# Patient Record
Sex: FEMALE | Race: WHITE | NOT HISPANIC OR LATINO | Employment: UNEMPLOYED | URBAN - METROPOLITAN AREA
[De-identification: names, ages, dates, MRNs, and addresses within clinical notes are randomized per-mention and may not be internally consistent; named-entity substitution may affect disease eponyms.]

---

## 2024-09-04 ENCOUNTER — APPOINTMENT (OUTPATIENT)
Dept: CT IMAGING | Facility: HOSPITAL | Age: 19
End: 2024-09-04

## 2024-09-04 ENCOUNTER — HOSPITAL ENCOUNTER (EMERGENCY)
Facility: HOSPITAL | Age: 19
Discharge: PSYCHIATRIC OR SUBSTANCE USE FACILITY | End: 2024-09-05
Attending: EMERGENCY MEDICINE

## 2024-09-04 DIAGNOSIS — F31.12 MODERATE BIPOLAR I DISORDER WITH MANIA AS CURRENT EPISODE: Primary | ICD-10-CM

## 2024-09-04 DIAGNOSIS — F12.90 MARIJUANA USE: ICD-10-CM

## 2024-09-04 DIAGNOSIS — G93.89 BRAIN MASS: ICD-10-CM

## 2024-09-04 LAB
AMPHET+METHAMPHET UR QL: NEGATIVE
AMPHETAMINES UR QL: NEGATIVE
ANION GAP SERPL CALCULATED.3IONS-SCNC: 6 MMOL/L (ref 5–15)
APAP SERPL-MCNC: <5 MCG/ML (ref 0–30)
B-HCG UR QL: NEGATIVE
BARBITURATES UR QL SCN: NEGATIVE
BASOPHILS # BLD AUTO: 0.03 10*3/MM3 (ref 0–0.2)
BASOPHILS NFR BLD AUTO: 0.4 % (ref 0–1.5)
BENZODIAZ UR QL SCN: NEGATIVE
BILIRUB UR QL STRIP: NEGATIVE
BUN SERPL-MCNC: 12 MG/DL (ref 6–20)
BUN/CREAT SERPL: 25.5 (ref 7–25)
BUPRENORPHINE SERPL-MCNC: NEGATIVE NG/ML
CALCIUM SPEC-SCNC: 9.2 MG/DL (ref 8.6–10.5)
CANNABINOIDS SERPL QL: POSITIVE
CHLORIDE SERPL-SCNC: 107 MMOL/L (ref 98–107)
CLARITY UR: ABNORMAL
CO2 SERPL-SCNC: 26 MMOL/L (ref 22–29)
COCAINE UR QL: NEGATIVE
COLOR UR: YELLOW
CREAT SERPL-MCNC: 0.47 MG/DL (ref 0.57–1)
DEPRECATED RDW RBC AUTO: 47.1 FL (ref 37–54)
EGFRCR SERPLBLD CKD-EPI 2021: 139.1 ML/MIN/1.73
EOSINOPHIL # BLD AUTO: 0.05 10*3/MM3 (ref 0–0.4)
EOSINOPHIL NFR BLD AUTO: 0.7 % (ref 0.3–6.2)
ERYTHROCYTE [DISTWIDTH] IN BLOOD BY AUTOMATED COUNT: 15 % (ref 12.3–15.4)
ETHANOL BLD-MCNC: <10 MG/DL (ref 0–10)
ETHANOL UR QL: <0.01 %
GLUCOSE SERPL-MCNC: 104 MG/DL (ref 65–99)
GLUCOSE UR STRIP-MCNC: NEGATIVE MG/DL
HCT VFR BLD AUTO: 36.1 % (ref 34–46.6)
HGB BLD-MCNC: 11.2 G/DL (ref 12–15.9)
HGB UR QL STRIP.AUTO: NEGATIVE
IMM GRANULOCYTES # BLD AUTO: 0.01 10*3/MM3 (ref 0–0.05)
IMM GRANULOCYTES NFR BLD AUTO: 0.1 % (ref 0–0.5)
KETONES UR QL STRIP: NEGATIVE
LEUKOCYTE ESTERASE UR QL STRIP.AUTO: NEGATIVE
LYMPHOCYTES # BLD AUTO: 2.1 10*3/MM3 (ref 0.7–3.1)
LYMPHOCYTES NFR BLD AUTO: 29.8 % (ref 19.6–45.3)
MAGNESIUM SERPL-MCNC: 2.3 MG/DL (ref 1.6–2.6)
MCH RBC QN AUTO: 26.4 PG (ref 26.6–33)
MCHC RBC AUTO-ENTMCNC: 31 G/DL (ref 31.5–35.7)
MCV RBC AUTO: 84.9 FL (ref 79–97)
METHADONE UR QL SCN: NEGATIVE
MONOCYTES # BLD AUTO: 0.57 10*3/MM3 (ref 0.1–0.9)
MONOCYTES NFR BLD AUTO: 8.1 % (ref 5–12)
NEUTROPHILS NFR BLD AUTO: 4.29 10*3/MM3 (ref 1.7–7)
NEUTROPHILS NFR BLD AUTO: 60.9 % (ref 42.7–76)
NITRITE UR QL STRIP: NEGATIVE
OPIATES UR QL: NEGATIVE
OXYCODONE UR QL SCN: NEGATIVE
PCP UR QL SCN: NEGATIVE
PH UR STRIP.AUTO: 6 [PH] (ref 5–8)
PLATELET # BLD AUTO: 324 10*3/MM3 (ref 140–450)
PMV BLD AUTO: 9.2 FL (ref 6–12)
POTASSIUM SERPL-SCNC: 3.8 MMOL/L (ref 3.5–5.2)
PROT UR QL STRIP: NEGATIVE
RBC # BLD AUTO: 4.25 10*6/MM3 (ref 3.77–5.28)
SALICYLATES SERPL-MCNC: <0.3 MG/DL
SODIUM SERPL-SCNC: 139 MMOL/L (ref 136–145)
SP GR UR STRIP: >=1.03 (ref 1–1.03)
TRICYCLICS UR QL SCN: NEGATIVE
UROBILINOGEN UR QL STRIP: ABNORMAL
WBC NRBC COR # BLD AUTO: 7.05 10*3/MM3 (ref 3.4–10.8)

## 2024-09-04 PROCEDURE — 81025 URINE PREGNANCY TEST: CPT | Performed by: EMERGENCY MEDICINE

## 2024-09-04 PROCEDURE — 81003 URINALYSIS AUTO W/O SCOPE: CPT | Performed by: EMERGENCY MEDICINE

## 2024-09-04 PROCEDURE — 99285 EMERGENCY DEPT VISIT HI MDM: CPT

## 2024-09-04 PROCEDURE — 70450 CT HEAD/BRAIN W/O DYE: CPT

## 2024-09-04 PROCEDURE — 85025 COMPLETE CBC W/AUTO DIFF WBC: CPT | Performed by: EMERGENCY MEDICINE

## 2024-09-04 PROCEDURE — 99285 EMERGENCY DEPT VISIT HI MDM: CPT | Performed by: EMERGENCY MEDICINE

## 2024-09-04 PROCEDURE — 83735 ASSAY OF MAGNESIUM: CPT | Performed by: EMERGENCY MEDICINE

## 2024-09-04 PROCEDURE — 80143 DRUG ASSAY ACETAMINOPHEN: CPT | Performed by: EMERGENCY MEDICINE

## 2024-09-04 PROCEDURE — 80306 DRUG TEST PRSMV INSTRMNT: CPT | Performed by: EMERGENCY MEDICINE

## 2024-09-04 PROCEDURE — 80048 BASIC METABOLIC PNL TOTAL CA: CPT | Performed by: EMERGENCY MEDICINE

## 2024-09-04 PROCEDURE — 36415 COLL VENOUS BLD VENIPUNCTURE: CPT

## 2024-09-04 PROCEDURE — 80179 DRUG ASSAY SALICYLATE: CPT | Performed by: EMERGENCY MEDICINE

## 2024-09-04 PROCEDURE — 82077 ASSAY SPEC XCP UR&BREATH IA: CPT | Performed by: EMERGENCY MEDICINE

## 2024-09-05 VITALS
WEIGHT: 200 LBS | SYSTOLIC BLOOD PRESSURE: 118 MMHG | OXYGEN SATURATION: 98 % | HEIGHT: 62 IN | HEART RATE: 77 BPM | BODY MASS INDEX: 36.8 KG/M2 | DIASTOLIC BLOOD PRESSURE: 63 MMHG | RESPIRATION RATE: 18 BRPM | TEMPERATURE: 97.9 F

## 2024-09-05 RX ORDER — ACETAMINOPHEN 500 MG
1000 TABLET ORAL ONCE
Status: COMPLETED | OUTPATIENT
Start: 2024-09-05 | End: 2024-09-05

## 2024-09-05 RX ADMIN — ACETAMINOPHEN 1000 MG: 500 TABLET ORAL at 17:40

## 2024-09-05 NOTE — ED NOTES
"Per [facility], \"report is to be called when patient is about to leave ED\" EMS ETA is 2200   "

## 2024-09-05 NOTE — ED NOTES
Referral form and COVID screening document faxed to [facility] for consult request per Dr. T [facility] order.

## 2024-09-05 NOTE — FSED PROVIDER NOTE
Subjective   History of Present Illness  19-year-old male with history of bipolar disease but unmedicated presents complaint of 3 months of headache body aches and unsteadiness.  Patient states no chest pain or shortness of breath no fever chills shakes no recent trauma.  Patient states she did strike her head into a fence in February and states this was not evaluated.  Patient states no new injury since then.  Of note during evaluation patient did exhibit flight of ideas and did state that she has been diagnosed with bipolar disease but has not been medicated and states several weeks ago she may have had some suicidal ideations but no homicidal ideations and is somewhat nebulous about whether she is currently having suicidal ideations.  Patient states she does hear voices at times but does not admit to any recently though tends to avoid answering this question directly.  Patient states no abdominal pain and that she has been eating well.      Review of Systems   Constitutional:  Positive for fatigue.   Neurological:  Positive for headaches.   Psychiatric/Behavioral:  Positive for behavioral problems, hallucinations and suicidal ideas. The patient is nervous/anxious.    All other systems reviewed and are negative.      Past Medical History:   Diagnosis Date    Bipolar 1 disorder        No Known Allergies    History reviewed. No pertinent surgical history.    History reviewed. No pertinent family history.    Social History     Socioeconomic History    Marital status: Single           Objective   Physical Exam  Vitals and nursing note reviewed.   Constitutional:       Appearance: Normal appearance.   HENT:      Head: Normocephalic and atraumatic.      Right Ear: External ear normal.      Left Ear: External ear normal.      Nose: Nose normal.      Mouth/Throat:      Mouth: Mucous membranes are moist.      Pharynx: Oropharynx is clear.   Eyes:      Extraocular Movements: Extraocular movements intact.      Pupils: Pupils  are equal, round, and reactive to light.   Cardiovascular:      Rate and Rhythm: Normal rate and regular rhythm.      Pulses: Normal pulses.      Heart sounds: Normal heart sounds.   Pulmonary:      Effort: Pulmonary effort is normal.      Breath sounds: Normal breath sounds.   Abdominal:      General: Abdomen is flat.      Palpations: Abdomen is soft.   Musculoskeletal:         General: Normal range of motion.      Cervical back: Normal range of motion and neck supple.   Skin:     General: Skin is warm.   Neurological:      General: No focal deficit present.      Mental Status: She is alert and oriented to person, place, and time. Mental status is at baseline.   Psychiatric:         Judgment: Judgment normal.      Comments: Flight of ideas, confusion, positive hallucinations auditory, possible SI 2 to 3 weeks ago but none now admitted to, no HI, manic       Procedures           ED Course  ED Course as of 09/05/24 1520   Wed Sep 04, 2024   2331 Patient with no acute findings on labs or studies other than marijuana usage.  Patient currently resting comfortably and we have gotten in touch with mental health services to evaluate patient. [TO]   Thu Sep 05, 2024   1516 Patient has been [facility].  Phone number is [phone] [TC]      ED Course User Index  [TC] Juan Spence MD  [TO] [facility], Todd, DO                                           Medical Decision Making  19-year-old female presents with 3 months of reported headache fatigue and bodyaches.  Per patient presentation she definitely has flight of ideas and appears to be somewhat manic at this time.  Do not suspect patient is threat to herself or others however she did admit to possible suicidal ideations 2 to 3 weeks ago and is somewhat vague whether not this is occurring now.  I do suspect patient has slight psychosis as patient does have some reported hallucinations  and will do basic labs and studies to assess for organic causes of patient's fatigue and headache but suspect this is probably more of a bipolar related issue.  Patient is okay with us trying to get in touch with mental health services to assist with this patient.  Patient states she is from [place] but has also received treatment in [place] as well.  Patient currently is nontoxic and hemodynamically stable.    Patient head CT does show what appears to be a left-sided mass of uncertain etiology.  I do not suspect this is an acute issue causing this patient's psychiatric issues at this time but it does need to be followed up as an outpatient and will refer patient to outpatient neurology for this issue.  At this time we are still awaiting mental health evaluator to call back to assess the patient.  Patient otherwise currently resting comfortably with no complaints.    Problems Addressed:  Brain mass: complicated acute illness or injury  Marijuana use: complicated acute illness or injury  Moderate bipolar I disorder with ricardo as current episode: complicated acute illness or injury    Amount and/or Complexity of Data Reviewed  Labs: ordered.  Radiology: ordered.        Final diagnoses:   Moderate bipolar I disorder with ricardo as current episode   Marijuana use   Brain mass       ED Disposition  ED Disposition       ED Disposition   DC/Transfer to Behavioral Health    Condition   Stable    Comment   [facility], Dr. Shirley               Provider, No Known  [facility] [address] [phone]          [facility]  [address] [place] [id number] [phone]  In 2 days           Medication List      No changes were made to your prescriptions during this visit.

## 2024-09-05 NOTE — ED NOTES
RHIANNA called [facility] intake line to verify referral request was received via fax. Kiersten in intake confirmed [facility] received the required documents. Unable to provide a timeline for patient evaluation during call.

## 2024-09-05 NOTE — ED NOTES
"Patient called out stating that she has a HA and \"weakness\" in her head. Patient denied tylenol. MD notified, patient back to sleep at this time. Patient has sitter at bedside   "

## 2024-09-05 NOTE — DISCHARGE INSTRUCTIONS
Return to EMD for increased pain, fever, vomiting or difficulty breathing. Drink plenty of fluids. No heavy lifting/exertion x 1 week.  Follow up with your PCM with the next week.  Follow-up with neurology referral to continue to monitor to the mass noted on your brain CT.

## 2024-09-05 NOTE — NURSING NOTE
Received call from Deana in ED @ [facility] in regards to PT. Report received and relayed all pertinent information to provider on-call (). After review,provider deemed [facility]; recommended that they proceed w/placement options that the [facility] offered after their evaluation of the PT. Call placed to Deana to update her in regards to decision.